# Patient Record
Sex: FEMALE | Race: WHITE | NOT HISPANIC OR LATINO | Employment: OTHER | URBAN - METROPOLITAN AREA
[De-identification: names, ages, dates, MRNs, and addresses within clinical notes are randomized per-mention and may not be internally consistent; named-entity substitution may affect disease eponyms.]

---

## 2019-09-30 ENCOUNTER — OFFICE VISIT (OUTPATIENT)
Dept: GASTROENTEROLOGY | Facility: CLINIC | Age: 79
End: 2019-09-30
Payer: COMMERCIAL

## 2019-09-30 VITALS
HEIGHT: 64 IN | BODY MASS INDEX: 26.46 KG/M2 | SYSTOLIC BLOOD PRESSURE: 122 MMHG | HEART RATE: 72 BPM | DIASTOLIC BLOOD PRESSURE: 82 MMHG | WEIGHT: 155 LBS

## 2019-09-30 DIAGNOSIS — K21.9 GASTROESOPHAGEAL REFLUX DISEASE, ESOPHAGITIS PRESENCE NOT SPECIFIED: ICD-10-CM

## 2019-09-30 DIAGNOSIS — K62.5 RECTAL BLEEDING: Primary | ICD-10-CM

## 2019-09-30 DIAGNOSIS — Z12.11 SCREENING FOR COLON CANCER: ICD-10-CM

## 2019-09-30 DIAGNOSIS — K59.09 OTHER CONSTIPATION: ICD-10-CM

## 2019-09-30 PROBLEM — K59.00 CONSTIPATION: Status: ACTIVE | Noted: 2019-09-30

## 2019-09-30 PROCEDURE — 99204 OFFICE O/P NEW MOD 45 MIN: CPT | Performed by: NURSE PRACTITIONER

## 2019-09-30 RX ORDER — CHLORAL HYDRATE 500 MG
1 CAPSULE ORAL DAILY
COMMUNITY

## 2019-09-30 RX ORDER — TRAMADOL HYDROCHLORIDE 50 MG/1
50 TABLET ORAL 2 TIMES DAILY PRN
Refills: 0 | COMMUNITY
Start: 2019-08-29

## 2019-09-30 RX ORDER — MULTIVITAMIN
1 TABLET ORAL DAILY
COMMUNITY

## 2019-09-30 RX ORDER — SERTRALINE HYDROCHLORIDE 25 MG/1
12.5 TABLET, FILM COATED ORAL DAILY
Refills: 1 | COMMUNITY
Start: 2019-09-03

## 2019-09-30 RX ORDER — CARVEDILOL 3.12 MG/1
3.12 TABLET ORAL 2 TIMES DAILY
Refills: 3 | COMMUNITY
Start: 2019-07-22

## 2019-09-30 RX ORDER — ASPIRIN 81 MG/1
81 TABLET ORAL DAILY
COMMUNITY

## 2019-09-30 RX ORDER — CYCLOBENZAPRINE HCL 10 MG
10 TABLET ORAL
COMMUNITY

## 2019-09-30 RX ORDER — ATORVASTATIN CALCIUM 10 MG/1
10 TABLET, FILM COATED ORAL DAILY
Refills: 3 | COMMUNITY
Start: 2019-08-25

## 2019-09-30 RX ORDER — GLUCOSAMINE HCL 500 MG
1 TABLET ORAL DAILY
COMMUNITY

## 2019-09-30 RX ORDER — ALPRAZOLAM 0.5 MG/1
0.5 TABLET ORAL
COMMUNITY

## 2019-09-30 RX ORDER — LANOLIN ALCOHOL/MO/W.PET/CERES
400 CREAM (GRAM) TOPICAL DAILY
COMMUNITY

## 2019-09-30 RX ORDER — AMLODIPINE BESYLATE 5 MG/1
5 TABLET ORAL DAILY
Refills: 3 | COMMUNITY
Start: 2019-07-12

## 2019-09-30 RX ORDER — LISINOPRIL 20 MG/1
20 TABLET ORAL 2 TIMES DAILY
Refills: 3 | COMMUNITY
Start: 2019-08-12

## 2019-09-30 NOTE — PATIENT INSTRUCTIONS
Increase water and fluids  MiraLax 17 g (1 capful) in 8 oz of water twice a day  One stool softener twice a day  Continue Protonix daily until seen at next office  Low residue diet - will give patient handout  If MiraLax and stool softeners do not produce a bowel movement in 24-48 hours can try Linzess 290 micrograms daily  Will provide samples to patient

## 2019-09-30 NOTE — PROGRESS NOTES
[id number] - Outpatient Consultation  Prescott Harada 78 y o  female MRN: [id number]  Encounter: [id number]    ASSESSMENT AND PLAN:      1  Rectal bleeding  Resolved  Recent hospitalization in the middle of September of this year when she presented to [facility] for rectal bleeding  Found to have left-sided colitis  Etiology not entirely clear  Working diagnoses  thought to be infectious versus ischemic colitis  She never experienced diarrhea and/or severe abdominal pain  - low-fiber diet  - would hold on flex sig or repeat colonoscopy for now unless symptoms continue      2  Other constipation  Longstanding issues constipation that has worsened since she was discharged from the hospital 2 weeks ago  She has taken 2 doses of MiraLax without alleviation  Prior to hospitalization she was on stool softeners which seem to be beneficial   Possibly side effect of Norvasc, as she currently is on 5 mg daily  When she was last seen in the office, in February of 2018, by Dr Martell Melendez she was on 2 5 mg and constipation did improve at that time  - Increase water and fluids  -MiraLax 17 g (1 capful) in 8 oz of water twice a day  -One stool softener twice a day  - If MiraLax coupled with stool softeners are ineffective in producing a bowel movement  I advised that she try Linzess 290 mcg daily  Sample boxes were provided to the patient  3  Screening for colon cancer  Negative colonoscopy in 2018  No recall advised  4  Gastroesophageal reflux disease, esophagitis presence not specified  Episodic dyspepsia throughout the years that typically is alleviated on PPI therapy in short increments  Last upper endoscopy in 2013 showed H pylori organisms  Pathology negative for Patel's or eosinophilic esophagitis  She did take eradication therapy as prescribed      -  protonix 40 mg daily until seen in the office in 3-4 weeks  -  avoid spicy, fried and acidic type foods  -  check stool antigen for H pylori  Will discuss at next office visit in a few weeks  Followup Appointment:  3-4 weeks  ______________________________________________________________________    Chief Complaint   Patient presents with    ER Follow up     Colitis       HPI:   Margareth Broderick is a 78y o  year old female who presents to the office following recent hospitalization at [facility] when she presented with rectal bleeding  She had several episodes of rectal bleeding over 24-48 hours which prompted emergency room visitation  Denied severe abdominal pain but did admit to some mild abdominal discomfort  initially that entirely resolved within a few hours  Denies any precipitating factors  Alleviated with time  Denied any diarrhea and typically has issues with chronic constipation that has worsened over the past few weeks  No further rectal bleeding  Constipation remains an issue and seems to have progressed since discharge from the hospital 2 weeks ago  She tried a couple courses of Maalox without any alleviation  Prior to that she was taking stool softeners episodically, which did seem to produce a bowel movement  Denies any associated fevers or chills  Reports she has not had a bowel movement in several days  She is passing flatus  Denies any nausea vomiting  She has lost 12 lb over the past year and a half  Appetite fair  Denies any changes in her medications or new medication  Denies any recent antibiotic use, sick contacts or travel  Historical Information   Past Medical History:   Diagnosis Date    Coronary artery disease     Esophageal dysmotility     s/p dilation    GERD (gastroesophageal reflux disease)     H  pylori infection     s/p treatment    Hyperlipidemia     Hypertension      Past Surgical History:   Procedure Laterality Date    COLONOSCOPY  01/09/2018    COLONOSCOPY  01/09/2018    No adenomatous colon polyps    Internal hemorrhoids and diverticulosis   CORONARY ARTERY BYPASS GRAFT      Triple bypass surgery    EGD  07/11/2013    UPPER GASTROINTESTINAL ENDOSCOPY  07/11/2013    "Ringed"appearing esophagus  Dilated up to [address] [place]  Pathology negative for Patel's or eosinophilic esophagitis  LUIS ARMANDO test positive for H pylori  Status post treatment with eradication therapy at that time  Lovely Shear WRIST SURGERY      Pins     Social History     Substance and Sexual Activity   Alcohol Use Yes    Frequency: 4 or more times a week    Drinks per session: 1 or 2    Binge frequency: Never    Comment: wine every other night     Social History     Substance and Sexual Activity   Drug Use Not on file     Social History     Tobacco Use   Smoking Status Never Smoker   Smokeless Tobacco Never Used     Family History   Problem Relation Age of Onset    Pancreatic cancer Mother     Hypertension Mother     Heart disease Father     Hypertension Sister     Hypertension Sister     Colon cancer Neg Hx     Colon polyps Neg Hx        Meds/Allergies     Current Outpatient Medications:     ALPRAZolam (XANAX) 0 5 mg tablet    amLODIPine (NORVASC) 5 mg tablet    aspirin (ECOTRIN LOW STRENGTH) 81 mg EC tablet    atorvastatin (LIPITOR) 10 mg tablet    carvedilol (COREG) 3 125 mg tablet    Coenzyme Q10 100 MG TABS    cyclobenzaprine (FLEXERIL) 10 mg tablet    folic acid (FOLVITE) 481 mcg tablet    lisinopril (ZESTRIL) 20 mg tablet    Multiple Vitamin (MULTIVITAMIN) tablet    Omega-3 Fatty Acids (OMEGA-3 FISH OIL) 1000 MG CAPS    sertraline (ZOLOFT) 25 mg tablet    traMADol (ULTRAM) 50 mg tablet    Allergies   Allergen Reactions    Compazine [Prochlorperazine]        PHYSICAL EXAM:    Blood pressure 122/82, pulse 72, height 5' 4" (1 626 m), weight 70 3 kg (155 lb)  Body mass index is 26 61 kg/m²  General Appearance: NAD, cooperative, alert  Eyes: Anicteric, PERRLA, EOMI  ENT:  Normocephalic, atraumatic, normal mucosa      Neck:  Supple, symmetrical, trachea midline,   Resp:  Clear to auscultation bilaterally; no rales, rhonchi or wheezing; respirations unlabored   CV:  S1 S2, Regular rate and rhythm; no murmur, rub, or gallop  GI:  Soft, non-tender, non-distended; normal bowel sounds; no masses, no organomegaly   Rectal: Deferred  Musculoskeletal: No cyanosis, clubbing or edema  Normal ROM  Skin:  No jaundice, rashes, or lesions   Heme/Lymph: No palpable cervical lymphadenopathy  Psych: Normal affect, good eye contact  Neuro: No gross deficits, AAOx3    Lab Results:     9//17/2019  CBC - normal    Radiology Results:   No results found  REVIEW OF SYSTEMS:    CONSTITUTIONAL: Denies any fever, chills, rigors  Objective positive for fatigue and weight loss  HEENT: No earache or tinnitus  Denies hearing loss or visual disturbances  CARDIOVASCULAR: No chest pain or palpitations  RESPIRATORY: Denies any cough, hemoptysis, shortness of breath or dyspnea on exertion  GASTROINTESTINAL: As noted in the History of Present Illness  GENITOURINARY: No problems with urination  Denies any hematuria or dysuria  NEUROLOGIC:  Positive for tremor and loss of strength  MUSCULOSKELETAL:  The positive for painful joints, joint stiffness and muscle aches  SKIN: Denies skin rashes or itching  ENDOCRINE: Denies excessive thirst  Denies intolerance to heat or cold  PSYCHOSOCIAL: Denies depression  Positive for anxiety and anorexia  Denies any recent memory loss

## 2019-10-30 ENCOUNTER — TELEPHONE (OUTPATIENT)
Dept: GASTROENTEROLOGY | Facility: CLINIC | Age: 79
End: 2019-10-30

## 2023-02-08 NOTE — TELEPHONE ENCOUNTER
Message left on nursing line at Dr Stanislav Smart office as per TM 
Please have patient take MiraLax 17 g in 8 oz twice a day and Colace 100 mg twice a day    Please make sure patient has a follow-up office visit with me and I will discuss checking stool for H pylori antigen at that time, thank you
SAKINA diazg from [place] from Dr Jose Juan Gallego office stating Pt is having some issues; requests CB to duc w/ nurse [phone] for Nrsg 
Spoke with Vern Yao  Patient was at [facility] ER on 10/29/19 for weakness and a fall  CBC and CMP were WNL  Dr Devin Valiente office saw patient today to f/u on the ER visit and they must have discussed that when the patient tried "1 or 2" of the [address] she had very bad watery diarrhea and they want to know if it is okay for patient to go back on using miralax and colace and what dosing please? When I reviewed patient's last OV it looked like you wanted to order h pylori antigen testing but I don't think the order was actually entered - do you still want that done?
Negative

## 2024-02-21 PROBLEM — Z12.11 SCREENING FOR COLON CANCER: Status: RESOLVED | Noted: 2019-09-30 | Resolved: 2024-02-21

## 2025-07-30 PROBLEM — E78.00 PURE HYPERCHOLESTEROLEMIA, UNSPECIFIED: Status: ACTIVE | Noted: 2025-07-30

## 2025-07-30 PROBLEM — F11.20 OPIOID DEPENDENCE, UNCOMPLICATED (HCC): Status: ACTIVE | Noted: 2025-07-30

## 2025-07-30 PROBLEM — I95.1 ORTHOSTATIC HYPOTENSION: Status: ACTIVE | Noted: 2025-07-30

## 2025-07-30 PROBLEM — I10 ESSENTIAL (PRIMARY) HYPERTENSION: Status: ACTIVE | Noted: 2025-07-30

## 2025-07-30 PROBLEM — N18.31 STAGE 3A CHRONIC KIDNEY DISEASE (HCC): Status: ACTIVE | Noted: 2025-07-30

## 2025-07-30 PROBLEM — R55 SYNCOPE AND COLLAPSE: Status: ACTIVE | Noted: 2025-07-30

## 2025-07-30 PROBLEM — F41.1 GENERALIZED ANXIETY DISORDER: Status: ACTIVE | Noted: 2025-07-30

## 2025-07-30 PROBLEM — I25.10 ATHEROSCLEROTIC HEART DISEASE OF NATIVE CORONARY ARTERY WITHOUT ANGINA PECTORIS: Status: ACTIVE | Noted: 2025-07-30

## 2025-07-30 PROBLEM — Z98.890 OTHER SPECIFIED POSTPROCEDURAL STATES: Status: ACTIVE | Noted: 2025-07-30

## 2025-07-30 PROBLEM — G45.9 TRANSIENT CEREBRAL ISCHEMIC ATTACK, UNSPECIFIED: Status: ACTIVE | Noted: 2025-07-30

## 2025-07-30 PROBLEM — E66.3 OVERWEIGHT: Status: ACTIVE | Noted: 2025-07-30
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
## 2025-07-30 PROBLEM — Z86.73 PERSONAL HISTORY OF TRANSIENT ISCHEMIC ATTACK (TIA), AND CEREBRAL INFARCTION WITHOUT RESIDUAL DEFICITS: Status: ACTIVE | Noted: 2025-07-30

## 2025-07-30 PROBLEM — M54.9 DORSALGIA, UNSPECIFIED: Status: ACTIVE | Noted: 2025-07-30

## 2025-07-30 PROBLEM — G20.A1: Status: ACTIVE | Noted: 2025-07-30

## 2025-07-30 PROBLEM — F32.5 MAJOR DEPRESSIVE DISORDER, SINGLE EPISODE IN FULL REMISSION (HCC): Status: ACTIVE | Noted: 2025-07-30

## 2025-07-30 PROBLEM — Z95.1 PRESENCE OF AORTOCORONARY BYPASS GRAFT: Status: ACTIVE | Noted: 2025-07-30

## 2025-07-30 PROBLEM — K44.9 DIAPHRAGMATIC HERNIA WITHOUT OBSTRUCTION OR GANGRENE: Status: ACTIVE | Noted: 2025-07-30

## 2025-07-30 PROBLEM — S09.90XA UNSPECIFIED INJURY OF HEAD, INITIAL ENCOUNTER: Status: ACTIVE | Noted: 2025-07-30

## 2025-07-30 PROBLEM — S62.92XA UNSPECIFIED FRACTURE OF LEFT WRIST AND HAND, INITIAL ENCOUNTER FOR CLOSED FRACTURE: Status: ACTIVE | Noted: 2025-07-30

## 2025-07-30 PROBLEM — H91.93 UNSPECIFIED HEARING LOSS, BILATERAL: Status: ACTIVE | Noted: 2025-07-30

## 2025-07-30 PROBLEM — Z79.02 LONG TERM (CURRENT) USE OF ANTITHROMBOTICS/ANTIPLATELETS: Status: ACTIVE | Noted: 2025-07-30

## 2025-07-30 PROBLEM — R41.0 DISORIENTATION, UNSPECIFIED: Status: ACTIVE | Noted: 2025-07-30

## 2025-07-31 ENCOUNTER — VBI (OUTPATIENT)
Dept: ADMINISTRATIVE | Facility: OTHER | Age: 85
End: 2025-07-31